# Patient Record
Sex: MALE | Race: WHITE | Employment: FULL TIME | URBAN - METROPOLITAN AREA
[De-identification: names, ages, dates, MRNs, and addresses within clinical notes are randomized per-mention and may not be internally consistent; named-entity substitution may affect disease eponyms.]

---

## 2024-06-12 RX ORDER — B-COMPLEX WITH VITAMIN C
1 TABLET ORAL EVERY MORNING
COMMUNITY

## 2024-06-12 RX ORDER — GINSENG 100 MG
1 CAPSULE ORAL NIGHTLY
COMMUNITY

## 2024-06-12 RX ORDER — CHLORAL HYDRATE 500 MG
2000 CAPSULE ORAL EVERY MORNING
COMMUNITY

## 2024-06-13 NOTE — H&P
[facility]    PATIENT'S NAME: OSCAR RAMOS    ATTENDING PHYSICIAN: Mark Lizarraga MD   PATIENT ACCOUNT#:   [id number]    LOCATION:    MEDICAL RECORD #:   [id number]       YOB: 1990  ADMISSION DATE:       06/19/2024    HISTORY AND PHYSICAL EXAMINATION    HISTORY OF PRESENT ILLNESS:  The patient is a 34-year-old right-hand dominant male with left elbow pain.  He was doing a moderately heavy upper body workout and afterward his elbow felt stiff and progressive locking late that night, pain with full extension and pain past 90 degrees of flexion.  He has a history of playing football, offensive and defensive line.  He does not recall any specific injury to his elbow.  He previously saw Dr. Bullock who had scheduled him for an open elbow procedure and then referred him to Dr. Lizarraga just to discuss arthroscopic procedure instead.      PAST MEDICAL HISTORY:  Significant for sleep apnea.     PAST SURGICAL HISTORY:  None.     MEDICATIONS:  None.    ALLERGIES:  None.    PHYSICAL EXAMINATION:  He is 6 feet 4 inches, 220 pounds.  Physical exam to the left elbow demonstrates elbow flexion to 90 with extension to 20, full pronosupination with pain with terminal stretching, and positive impingement signs.      MRI scan of the left elbow demonstrates osteoarthritis with multiple loose bodies in the anterior and posterior joint compartments.     ASSESSMENT AND PLAN:  The patient is a 34-year-old gentleman with left elbow osteoarthritis and loose bodies.  At this time will proceed with left elbow arthroscopic debridement, removal of loose bodies from his elbow.  We discussed all the risks and complications associated with surgery, and he would like to proceed at this time.    Mary Mack PA-C, dictating for Mark Lizarraga MD.     Dictated By Mark Lizarraga MD  d: 06/13/2024 07:00:46  t: 06/13/2024 07:29:20  Job [id number]  /

## 2024-06-18 NOTE — DISCHARGE INSTRUCTIONS
HOME INSTRUCTIONS  Please refer to instructions provided by Dr. Lizarraga's office   AMBSURG HOME CARE INSTRUCTIONS: POST-OP ANESTHESIA  The medication that you received for sedation or general anesthesia can last up to 24 hours. Your judgment and reflexes may be altered, even if you feel like your normal self.      We Recommend:   Do not drive any motor vehicle or bicycle   Avoid mowing the lawn, playing sports, or working with power tools/applicances (power saws, electric knives or mixers)   That you have someone stay with you on your first night home   Do not drink alcohol or take sleeping pills or tranquilizers   Do not sign legal documents within 24 hours of your procedure   If you had a nerve block for your surgery, take extra care not to put any pressure on your arm or hand for 24 hours    It is normal:  For you to have a sore throat if you had a breathing tube during surgery (while you were asleep!). The sore throat should get better within 48 hours. You can gargle with warm salt water (1/2 tsp in 4 oz warm water) or use a throat lozenge for comfort  To feel muscle aches or soreness especially in the abdomen, chest or neck. The achy feeling should go away in the next 24 hours  To feel weak, sleepy or \"wiped out\". Your should start feeling better in the next 24 hours.   To experience mild discomforts such as sore lip or tongue, headache, cramps, gas pains or a bloated feeling in your abdomen.   To experience mild back pain or soreness for a day or two if you had spinal or epidural anesthesia.   If you had laparoscopic surgery, to feel shoulder pain or discomfort on the day of surgery.   For some patients to have nausea after surgery/anesthesia    If you feel nausea or experience vomiting:   Try to move around less.   Eat less than usual or drink only liquids until the next morning   Nausea should resolve in about 24 hours    If you have a problem when you are at home:    Call your surgeons office   Discharge  Instructions: After Your Surgery  You’ve just had surgery. During surgery, you were given medicine called anesthesia to keep you relaxed and free of pain. After surgery, you may have some pain or nausea. This is common. Here are some tips for feeling better and getting well after surgery.   Going home  Your healthcare provider will show you how to take care of yourself when you go home. They'll also answer your questions. Have an adult family member or friend drive you home. For the first 24 hours after your surgery:   Don't drive or use heavy equipment.  Don't make important decisions or sign legal papers.  Take medicines as directed.  Don't drink alcohol.  Have someone stay with you, if needed. They can watch for problems and help keep you safe.  Be sure to go to all follow-up visits with your healthcare provider. And rest after your surgery for as long as your provider tells you to.   Coping with pain  If you have pain after surgery, pain medicine will help you feel better. Take it as directed, before pain becomes severe. Also, ask your healthcare provider or pharmacist about other ways to control pain. This might be with heat, ice, or relaxation. And follow any other instructions your surgeon or nurse gives you.      Stay on schedule with your medicine.     Tips for taking pain medicine  To get the best relief possible, remember these points:   Pain medicines can upset your stomach. Taking them with a little food may help.  Most pain relievers taken by mouth need at least 20 to 30 minutes to start to work.  Don't wait till your pain becomes severe before you take your medicine. Try to time your medicine so that you can take it before starting an activity. This might be before you get dressed, go for a walk, or sit down for dinner.  Constipation is a common side effect of some pain medicines. Call your healthcare provider before taking any medicines such as laxatives or stool softeners to help ease constipation.  Also ask if you should skip any foods. Drinking lots of fluids and eating foods such as fruits and vegetables that are high in fiber can also help. Remember, don't take laxatives unless your surgeon has prescribed them.  Drinking alcohol and taking pain medicine can cause dizziness and slow your breathing. It can even be deadly. Don't drink alcohol while taking pain medicine.  Pain medicine can make you react more slowly to things. Don't drive or run machinery while taking pain medicine.  Your healthcare provider may tell you to take acetaminophen to help ease your pain. Ask them how much you're supposed to take each day. Acetaminophen or other pain relievers may interact with your prescription medicines or other over-the-counter (OTC) medicines. Some prescription medicines have acetaminophen and other ingredients in them. Using both prescription and OTC acetaminophen for pain can cause you to accidentally overdose. Read the labels on your OTC medicines with care. This will help you to clearly know the list of ingredients, how much to take, and any warnings. It may also help you not take too much acetaminophen. If you have questions or don't understand the information, ask your pharmacist or healthcare provider to explain it to you before you take the OTC medicine.   Managing nausea  Some people have an upset stomach (nausea) after surgery. This is often because of anesthesia, pain, or pain medicine, less movement of food in the stomach, or the stress of surgery. These tips will help you handle nausea and eat healthy foods as you get better. If you were on a special food plan before surgery, ask your healthcare provider if you should follow it while you get better. Check with your provider on how your eating should progress. It may depend on the surgery you had. These general tips may help:   Don't push yourself to eat. Your body will tell you when to eat and how much.  Start off with clear liquids and soup. They're  easier to digest.  Next try semi-solid foods as you feel ready. These include mashed potatoes, applesauce, and gelatin.  Slowly move to solid foods. Don’t eat fatty, rich, or spicy foods at first.  Don't force yourself to have 3 large meals a day. Instead eat smaller amounts more often.  Take pain medicines with a small amount of solid food, such as crackers or toast. This helps prevent nausea.  When to call your healthcare provider  Call your healthcare provider right away if you have any of these:   You still have too much pain, or the pain gets worse, after taking the medicine. The medicine may not be strong enough. Or there may be a complication from the surgery.  You feel too sleepy, dizzy, or groggy. The medicine may be too strong.  Side effects such as nausea or vomiting. Your healthcare provider may advise taking other medicines to .  Skin changes such as rash, itching, or hives. This may mean you have an allergic reaction. Your provider may advise taking other medicines.  The incision looks different (for instance, part of it opens up).  Bleeding or fluid leaking from the incision site, and weren't told to expect that.  Fever of 100.4°F (38°C) or higher, or as directed by your provider.  Call 911  Call 911 right away if you have:   Trouble breathing  Facial swelling    If you have obstructive sleep apnea   You were given anesthesia medicine during surgery to keep you comfortable and free of pain. After surgery, you may have more apnea spells because of this medicine and other medicines you were given. The spells may last longer than normal.    At home:  Keep using the continuous positive airway pressure (CPAP) device when you sleep. Unless your healthcare provider tells you not to, use it when you sleep, day or night. CPAP is a common device used to treat obstructive sleep apnea.  Talk with your provider before taking any pain medicine, muscle relaxants, or sedatives. Your provider will tell you about the  possible dangers of taking these medicines.  Contact your provider if your sleeping changes a lot even when taking medicines as directed.  Samuel last reviewed this educational content on 10/1/2021  © 0955-2561 [employer]. All rights reserved. This information is not intended as a substitute for professional medical care. Always follow your healthcare professional's instructions.

## 2024-06-19 ENCOUNTER — HOSPITAL ENCOUNTER (OUTPATIENT)
Facility: HOSPITAL | Age: 34
Setting detail: HOSPITAL OUTPATIENT SURGERY
Discharge: HOME | End: 2024-06-19
Attending: ORTHOPAEDIC SURGERY | Admitting: ORTHOPAEDIC SURGERY
Payer: COMMERCIAL

## 2024-06-19 ENCOUNTER — ANESTHESIA EVENT (OUTPATIENT)
Dept: SURGERY | Facility: HOSPITAL | Age: 34
End: 2024-06-19
Payer: COMMERCIAL

## 2024-06-19 ENCOUNTER — ANESTHESIA (OUTPATIENT)
Dept: SURGERY | Facility: HOSPITAL | Age: 34
End: 2024-06-19
Payer: COMMERCIAL

## 2024-06-19 VITALS
HEIGHT: 75 IN | HEART RATE: 56 BPM | SYSTOLIC BLOOD PRESSURE: 126 MMHG | BODY MASS INDEX: 27.35 KG/M2 | OXYGEN SATURATION: 97 % | DIASTOLIC BLOOD PRESSURE: 71 MMHG | RESPIRATION RATE: 16 BRPM | WEIGHT: 220 LBS | TEMPERATURE: 98 F

## 2024-06-19 DIAGNOSIS — M19.029 OSTEOARTHRITIS OF ELBOW, UNSPECIFIED LATERALITY, UNSPECIFIED OSTEOARTHRITIS TYPE: Primary | ICD-10-CM

## 2024-06-19 PROCEDURE — 64415 NJX AA&/STRD BRCH PLXS IMG: CPT | Performed by: ORTHOPAEDIC SURGERY

## 2024-06-19 PROCEDURE — 0RBM4ZZ EXCISION OF LEFT ELBOW JOINT, PERCUTANEOUS ENDOSCOPIC APPROACH: ICD-10-PCS | Performed by: ORTHOPAEDIC SURGERY

## 2024-06-19 PROCEDURE — 0RCM4ZZ EXTIRPATION OF MATTER FROM LEFT ELBOW JOINT, PERCUTANEOUS ENDOSCOPIC APPROACH: ICD-10-PCS | Performed by: ORTHOPAEDIC SURGERY

## 2024-06-19 RX ORDER — LIDOCAINE HYDROCHLORIDE 10 MG/ML
INJECTION, SOLUTION EPIDURAL; INFILTRATION; INTRACAUDAL; PERINEURAL AS NEEDED
Status: DISCONTINUED | OUTPATIENT
Start: 2024-06-19 | End: 2024-06-19 | Stop reason: SURG

## 2024-06-19 RX ORDER — HYDROMORPHONE HYDROCHLORIDE 1 MG/ML
0.6 INJECTION, SOLUTION INTRAMUSCULAR; INTRAVENOUS; SUBCUTANEOUS EVERY 5 MIN PRN
Status: DISCONTINUED | OUTPATIENT
Start: 2024-06-19 | End: 2024-06-19

## 2024-06-19 RX ORDER — MORPHINE SULFATE 4 MG/ML
4 INJECTION, SOLUTION INTRAMUSCULAR; INTRAVENOUS EVERY 10 MIN PRN
Status: DISCONTINUED | OUTPATIENT
Start: 2024-06-19 | End: 2024-06-19

## 2024-06-19 RX ORDER — HYDROMORPHONE HYDROCHLORIDE 1 MG/ML
0.2 INJECTION, SOLUTION INTRAMUSCULAR; INTRAVENOUS; SUBCUTANEOUS EVERY 5 MIN PRN
Status: DISCONTINUED | OUTPATIENT
Start: 2024-06-19 | End: 2024-06-19

## 2024-06-19 RX ORDER — NALOXONE HYDROCHLORIDE 0.4 MG/ML
80 INJECTION, SOLUTION INTRAMUSCULAR; INTRAVENOUS; SUBCUTANEOUS AS NEEDED
Status: DISCONTINUED | OUTPATIENT
Start: 2024-06-19 | End: 2024-06-19

## 2024-06-19 RX ORDER — INDOMETHACIN 75 MG/1
75 CAPSULE, EXTENDED RELEASE ORAL
Status: SHIPPED | COMMUNITY
Start: 2024-06-19

## 2024-06-19 RX ORDER — CEPHALEXIN 500 MG/1
500 CAPSULE ORAL 4 TIMES DAILY
Status: SHIPPED | COMMUNITY

## 2024-06-19 RX ORDER — SODIUM CHLORIDE, SODIUM LACTATE, POTASSIUM CHLORIDE, CALCIUM CHLORIDE 600; 310; 30; 20 MG/100ML; MG/100ML; MG/100ML; MG/100ML
INJECTION, SOLUTION INTRAVENOUS CONTINUOUS
Status: DISCONTINUED | OUTPATIENT
Start: 2024-06-19 | End: 2024-06-19

## 2024-06-19 RX ORDER — ONDANSETRON 2 MG/ML
4 INJECTION INTRAMUSCULAR; INTRAVENOUS EVERY 6 HOURS PRN
Status: DISCONTINUED | OUTPATIENT
Start: 2024-06-19 | End: 2024-06-19

## 2024-06-19 RX ORDER — MORPHINE SULFATE 4 MG/ML
2 INJECTION, SOLUTION INTRAMUSCULAR; INTRAVENOUS EVERY 10 MIN PRN
Status: DISCONTINUED | OUTPATIENT
Start: 2024-06-19 | End: 2024-06-19

## 2024-06-19 RX ORDER — HYDROMORPHONE HYDROCHLORIDE 1 MG/ML
0.4 INJECTION, SOLUTION INTRAMUSCULAR; INTRAVENOUS; SUBCUTANEOUS EVERY 5 MIN PRN
Status: DISCONTINUED | OUTPATIENT
Start: 2024-06-19 | End: 2024-06-19

## 2024-06-19 RX ORDER — MORPHINE SULFATE 15 MG/1
15 TABLET, FILM COATED, EXTENDED RELEASE ORAL EVERY 12 HOURS SCHEDULED
Status: SHIPPED | COMMUNITY
Start: 2024-06-19

## 2024-06-19 RX ORDER — ONDANSETRON 4 MG/1
4 TABLET, FILM COATED ORAL EVERY 8 HOURS PRN
Status: SHIPPED | COMMUNITY

## 2024-06-19 RX ORDER — ONDANSETRON 2 MG/ML
INJECTION INTRAMUSCULAR; INTRAVENOUS AS NEEDED
Status: DISCONTINUED | OUTPATIENT
Start: 2024-06-19 | End: 2024-06-19 | Stop reason: SURG

## 2024-06-19 RX ORDER — NEOSTIGMINE METHYLSULFATE 1 MG/ML
INJECTION, SOLUTION INTRAVENOUS AS NEEDED
Status: DISCONTINUED | OUTPATIENT
Start: 2024-06-19 | End: 2024-06-19 | Stop reason: SURG

## 2024-06-19 RX ORDER — ACETAMINOPHEN 500 MG
1000 TABLET ORAL ONCE
Status: COMPLETED | OUTPATIENT
Start: 2024-06-19 | End: 2024-06-19

## 2024-06-19 RX ORDER — HYDROCODONE BITARTRATE AND ACETAMINOPHEN 10; 325 MG/1; MG/1
TABLET ORAL
Status: SHIPPED | COMMUNITY

## 2024-06-19 RX ORDER — DEXAMETHASONE SODIUM PHOSPHATE 4 MG/ML
VIAL (ML) INJECTION AS NEEDED
Status: DISCONTINUED | OUTPATIENT
Start: 2024-06-19 | End: 2024-06-19 | Stop reason: SURG

## 2024-06-19 RX ORDER — PROCHLORPERAZINE EDISYLATE 5 MG/ML
5 INJECTION INTRAMUSCULAR; INTRAVENOUS EVERY 8 HOURS PRN
Status: DISCONTINUED | OUTPATIENT
Start: 2024-06-19 | End: 2024-06-19

## 2024-06-19 RX ORDER — MORPHINE SULFATE 10 MG/ML
6 INJECTION, SOLUTION INTRAMUSCULAR; INTRAVENOUS EVERY 10 MIN PRN
Status: DISCONTINUED | OUTPATIENT
Start: 2024-06-19 | End: 2024-06-19

## 2024-06-19 RX ORDER — ROPIVACAINE HYDROCHLORIDE 5 MG/ML
INJECTION, SOLUTION EPIDURAL; INFILTRATION; PERINEURAL
Status: COMPLETED | OUTPATIENT
Start: 2024-06-19 | End: 2024-06-19

## 2024-06-19 RX ORDER — GLYCOPYRROLATE 0.2 MG/ML
INJECTION, SOLUTION INTRAMUSCULAR; INTRAVENOUS AS NEEDED
Status: DISCONTINUED | OUTPATIENT
Start: 2024-06-19 | End: 2024-06-19 | Stop reason: SURG

## 2024-06-19 RX ORDER — ROCURONIUM BROMIDE 10 MG/ML
INJECTION, SOLUTION INTRAVENOUS AS NEEDED
Status: DISCONTINUED | OUTPATIENT
Start: 2024-06-19 | End: 2024-06-19 | Stop reason: SURG

## 2024-06-19 RX ADMIN — ONDANSETRON 4 MG: 2 INJECTION INTRAMUSCULAR; INTRAVENOUS at 08:20:00

## 2024-06-19 RX ADMIN — GLYCOPYRROLATE 0.8 MG: 0.2 INJECTION, SOLUTION INTRAMUSCULAR; INTRAVENOUS at 08:28:00

## 2024-06-19 RX ADMIN — ROCURONIUM BROMIDE 40 MG: 10 INJECTION, SOLUTION INTRAVENOUS at 07:20:00

## 2024-06-19 RX ADMIN — NEOSTIGMINE METHYLSULFATE 5 MG: 1 INJECTION, SOLUTION INTRAVENOUS at 08:28:00

## 2024-06-19 RX ADMIN — LIDOCAINE HYDROCHLORIDE 50 MG: 10 INJECTION, SOLUTION EPIDURAL; INFILTRATION; INTRACAUDAL; PERINEURAL at 07:20:00

## 2024-06-19 RX ADMIN — DEXAMETHASONE SODIUM PHOSPHATE 4 MG: 4 MG/ML VIAL (ML) INJECTION at 08:20:00

## 2024-06-19 RX ADMIN — ROPIVACAINE HYDROCHLORIDE 30 ML: 5 INJECTION, SOLUTION EPIDURAL; INFILTRATION; PERINEURAL at 09:12:00

## 2024-06-19 NOTE — ANESTHESIA PROCEDURE NOTES
Airway  Date/Time: 6/19/2024 7:20 AM  Urgency: Elective    Airway not difficult    General Information and Staff    Patient location during procedure: OR  Anesthesiologist: Dustin Gordon MD  Performed: anesthesiologist   Performed by: Dustin Gordon MD  Authorized by: Dustin Gordon MD      Indications and Patient Condition  Indications for airway management: anesthesia  Sedation level: deep  Preoxygenated: yes  Patient position: sniffing  Mask difficulty assessment: 1 - vent by mask    Final Airway Details  Final airway type: endotracheal airway      Successful airway: ETT  Cuffed: yes   Successful intubation technique: direct laryngoscopy  Endotracheal tube insertion site: oral    Placement verified by: capnometry   Measured from: teeth  Number of attempts at approach: 1

## 2024-06-19 NOTE — INTERVAL H&P NOTE
Pre-op Diagnosis: Osteoarthritis    The above referenced H&P was reviewed by FERNANDO HINES PA on 6/19/2024, the patient was examined and no significant changes have occurred in the patient's condition since the H&P was performed.  I discussed with the patient and/or legal representative the potential benefits, risks and side effects of this procedure; the likelihood of the patient achieving goals; and potential problems that might occur during recuperation.  I discussed reasonable alternatives to the procedure, including risks, benefits and side effects related to the alternatives and risks related to not receiving this procedure.  We will proceed with procedure as planned.

## 2024-06-19 NOTE — ANESTHESIA POSTPROCEDURE EVALUATION
Patient: Min Guidry    Procedure Summary       Date: 06/19/24 Room / Location: [facility] MAIN OR  / [facility] MAIN OR    Anesthesia Start: 0712 Anesthesia Stop:     Procedure: Left elbow arthroscopic debridement, removal loose bodies (Left: Elbow) Diagnosis: (Osteoarthritis)    Surgeons: Mark Lizarraga MD Anesthesiologist: Melly Mckeon MD    Anesthesia Type: general ASA Status: 2            Anesthesia Type: general    Vitals Value Taken Time   /96 06/19/24 0903   Temp 98 06/19/24 0912   Pulse 67 06/19/24 0911   Resp 16 06/19/24 0911   SpO2 99 % 06/19/24 0911   Vitals shown include unfiled device data.    [facility] AN Post Evaluation:   Patient Evaluated in PACU  Patient Participation: complete - patient participated  Level of Consciousness: awake  Pain Management: adequate  Airway Patency:patent  Dental exam unchanged from preop  Yes    Cardiovascular Status: acceptable  Respiratory Status: acceptable  Postoperative Hydration acceptable      MELLY MCKEON MD  6/19/2024 9:12 AM

## 2024-06-19 NOTE — BRIEF OP NOTE
Pre-Operative Diagnosis: Osteoarthritis     Post-Operative Diagnosis: Osteoarthritis      Procedure Performed:   Left elbow arthroscopic debridement, removal loose bodies    Surgeons and Role:     * Mark Lizarraga MD - Primary    Assistant(s):  PA: Dobroveanu, Amy, PA Michael Peabody MD    Surgical Findings: same     Specimen: loose bodies     Estimated Blood Loss: No data recorded      FERNANDO HINES PA  6/19/2024  10:56 AM

## 2024-06-19 NOTE — ANESTHESIA PROCEDURE NOTES
Peripheral Block    Date/Time: 6/19/2024 9:12 AM    Performed by: Dustin Gordon MD  Authorized by: Dustin Gordon MD      General Information and Staff    Start Time:  6/19/2024 9:12 AM  End Time:  6/19/2024 9:12 AM  Anesthesiologist:  Dustin Gordon MD  Performed by:  Anesthesiologist  Patient Location:  PACU      Site Identification: real time ultrasound guided and image stored and retrievable      Reason for Block: at surgeon's request and post-op pain management    Preanesthetic Checklist: 2 patient identifers, IV checked, risks and benefits discussed, monitors and equipment checked, pre-op evaluation, timeout performed, anesthesia consent and sterile technique used      Procedure Details    Patient Position:  Sitting  Prep: ChloraPrep    Monitoring:  Cardiac monitor  Block Type:  Interscalene  Laterality:  Left  Injection Technique:  Single-shot    Needle    Needle Type:  Short-bevel  Needle Gauge:  22 G  Needle Length:  50 mm  Needle Localization:  Ultrasound guidance              Assessment    Injection Assessment:  Good spread noted, incremental injection, local visualized surrounding nerve on ultrasound, low pressure, negative aspiration for heme and no pain on injection  Heart Rate Change: No        Medications  6/19/2024 9:12 AM  ropivacaine (NAROPIN) injection 0.5% - Infiltration   30 mL - 6/19/2024 9:12:00 AM    Additional Comments

## 2024-06-19 NOTE — ANESTHESIA PREPROCEDURE EVALUATION
Anesthesia PreOp Note    HPI:     Min Guidry is a 34 year old male who presents for preoperative consultation requested by: Mark Lizarraga MD    Date of Surgery: 6/19/2024    Procedure(s):  Left elbow arthroscopic debridement, removal loose bodies  Indication: Osteoarthritis    Relevant Problems   No relevant active problems       NPO:  Last Liquid Consumption Date: 06/18/24  Last Liquid Consumption Time: 2300  Last Solid Consumption Date: 06/18/24  Last Solid Consumption Time: 2300  Last Liquid Consumption Date: 06/18/24          History Review:  There are no problems to display for this patient.      Past Medical History:    Anxiety state    Sleep apnea    uses CPAP    Visual impairment    wears glasses/contacts       Past Surgical History:   Procedure Laterality Date    Colonoscopy      1/2024    Other surgical history  01/01/2007    left thumb UCL repair    Tonsillectomy         Medications Prior to Admission   Medication Sig Dispense Refill Last Dose    B Complex Vitamins (VITAMIN B COMPLEX) Oral Tab Take 1 tablet by mouth every morning.   6/14/2024    METHYLSULFONYLMETHANE OR Take 2 tablets by mouth every morning.   6/14/2024    omega-3 fatty acids 1000 MG Oral Cap Take 2,000 mg by mouth every morning.   6/14/2024    Zinc 50 MG Oral Tab Take 1 tablet by mouth at bedtime.   6/14/2024    MAGNESIUM CITRATE OR Take 1 tablet by mouth at bedtime.   6/14/2024    PAXIL 30 MG OR TABS 1 TABLET DAILY (Patient not taking: Reported on 6/12/2024)   Not Taking    SONATA 10 MG OR CAPS 1 CAPSULE AT BEDTIME (Patient not taking: Reported on 6/12/2024)   Not Taking     Current Facility-Administered Medications Ordered in Epic   Medication Dose Route Frequency Provider Last Rate Last Admin    lactated ringers infusion   Intravenous Continuous Mark Lizarraga MD 20 mL/hr at 06/19/24 0629 New Bag at 06/19/24 0629    ceFAZolin (Ancef) 2g in 10mL IV syringe premix  2 g Intravenous Once Mary Mack PA         No current  [facility]-ordered outpatient medications on file.       No Known Allergies    Family History   Problem Relation Age of Onset    Heart Disorder Maternal Grandfather     Heart Disorder Paternal Grandfather      Social History     Socioeconomic History    Marital status:    Tobacco Use    Smoking status: Never    Smokeless tobacco: Never   Vaping Use    Vaping status: Never Used   Substance and Sexual Activity    Alcohol use: Not Currently     Alcohol/week: 16.0 standard drinks of alcohol     Types: 16 Cans of beer per week    Drug use: No       Available pre-op labs reviewed.             Vital Signs:  Body mass index is 27.5 kg/m².   height is 1.905 m (6' 3\") and weight is 99.8 kg (220 lb).   Vitals:    06/12/24 1158   Weight: 99.8 kg (220 lb)   Height: 1.905 m (6' 3\")        Anesthesia Evaluation     Patient summary reviewed and Nursing notes reviewed    Airway   Mallampati: I  Dental      Pulmonary - negative ROS and normal exam   Cardiovascular - normal exam  Exercise tolerance: good    NYHA Classification: I    Neuro/Psych - negative ROS     GI/Hepatic/Renal - negative ROS     Endo/Other - negative ROS   Abdominal                  Anesthesia Plan:   ASA:  2  Plan:   General  Airway:  ETT      I have informed Min Guidry and/or legal guardian or family member of the nature of the anesthetic plan, benefits, risks including possible dental damage if relevant, major complications, and any alternative forms of anesthetic management.   All of the patient's questions were answered to the best of my ability. The patient desires the anesthetic management as planned.  MELLY MCKEON MD  6/19/2024 6:56 AM  Present on Admission:  **None**

## 2024-12-27 ENCOUNTER — HOSPITAL ENCOUNTER (EMERGENCY)
Age: 34
Discharge: HOME | End: 2024-12-27

## 2024-12-27 ENCOUNTER — APPOINTMENT (OUTPATIENT)
Dept: GENERAL RADIOLOGY | Age: 34
End: 2024-12-27

## 2024-12-27 VITALS
RESPIRATION RATE: 13 BRPM | SYSTOLIC BLOOD PRESSURE: 127 MMHG | TEMPERATURE: 98.5 F | OXYGEN SATURATION: 98 % | DIASTOLIC BLOOD PRESSURE: 91 MMHG | HEART RATE: 57 BPM

## 2024-12-27 DIAGNOSIS — R07.9 CHEST PAIN, UNSPECIFIED TYPE: Primary | ICD-10-CM

## 2024-12-27 LAB
ALBUMIN SERPL-MCNC: 4.2 G/DL (ref 3.4–5)
ALBUMIN/GLOB SERPL: 1.1 {RATIO} (ref 1–2.4)
ALP SERPL-CCNC: 99 UNITS/L (ref 45–117)
ALT SERPL-CCNC: 39 UNITS/L
ANION GAP SERPL CALC-SCNC: 16 MMOL/L (ref 7–19)
AST SERPL-CCNC: 23 UNITS/L
BASOPHILS # BLD: 0.1 K/MCL (ref 0–0.3)
BASOPHILS NFR BLD: 1 %
BILIRUB SERPL-MCNC: 0.5 MG/DL (ref 0.2–1)
BUN SERPL-MCNC: 13 MG/DL (ref 6–20)
BUN/CREAT SERPL: 15 (ref 7–25)
CALCIUM SERPL-MCNC: 9.2 MG/DL (ref 8.4–10.2)
CHLORIDE SERPL-SCNC: 101 MMOL/L (ref 97–110)
CO2 SERPL-SCNC: 27 MMOL/L (ref 21–32)
CREAT SERPL-MCNC: 0.87 MG/DL (ref 0.67–1.17)
DEPRECATED RDW RBC: 40 FL (ref 39–50)
EGFRCR SERPLBLD CKD-EPI 2021: >90 ML/MIN/{1.73_M2}
EOSINOPHIL # BLD: 0.2 K/MCL (ref 0–0.5)
EOSINOPHIL NFR BLD: 3 %
ERYTHROCYTE [DISTWIDTH] IN BLOOD: 12 % (ref 11–15)
FASTING DURATION TIME PATIENT: ABNORMAL H
FLUAV RNA RESP QL NAA+PROBE: NOT DETECTED
FLUBV RNA RESP QL NAA+PROBE: NOT DETECTED
GLOBULIN SER-MCNC: 3.8 G/DL (ref 2–4)
GLUCOSE SERPL-MCNC: 113 MG/DL (ref 70–99)
HCT VFR BLD CALC: 43.8 % (ref 39–51)
HGB BLD-MCNC: 14.9 G/DL (ref 13–17)
IMM GRANULOCYTES # BLD AUTO: 0 K/MCL (ref 0–0.2)
IMM GRANULOCYTES # BLD: 0 %
LYMPHOCYTES # BLD: 1.6 K/MCL (ref 1–4.8)
LYMPHOCYTES NFR BLD: 23 %
MAGNESIUM SERPL-MCNC: 2.1 MG/DL (ref 1.7–2.4)
MCH RBC QN AUTO: 30.8 PG (ref 26–34)
MCHC RBC AUTO-ENTMCNC: 34 G/DL (ref 32–36.5)
MCV RBC AUTO: 90.7 FL (ref 78–100)
MONOCYTES # BLD: 0.6 K/MCL (ref 0.3–0.9)
MONOCYTES NFR BLD: 9 %
NEUTROPHILS # BLD: 4.4 K/MCL (ref 1.8–7.7)
NEUTROPHILS NFR BLD: 64 %
NRBC BLD MANUAL-RTO: 0 /100 WBC
PLATELET # BLD AUTO: 209 K/MCL (ref 140–450)
POTASSIUM SERPL-SCNC: 4.2 MMOL/L (ref 3.4–5.1)
PROT SERPL-MCNC: 8 G/DL (ref 6.4–8.2)
RBC # BLD: 4.83 MIL/MCL (ref 4.5–5.9)
RSV AG NPH QL IA.RAPID: NOT DETECTED
SARS-COV-2 RNA RESP QL NAA+PROBE: NOT DETECTED
SERVICE CMNT-IMP: NORMAL
SERVICE CMNT-IMP: NORMAL
SODIUM SERPL-SCNC: 140 MMOL/L (ref 135–145)
TROPONIN I SERPL DL<=0.01 NG/ML-MCNC: <4 NG/L
WBC # BLD: 6.8 K/MCL (ref 4.2–11)

## 2024-12-27 PROCEDURE — 71045 X-RAY EXAM CHEST 1 VIEW: CPT

## 2024-12-27 PROCEDURE — 83735 ASSAY OF MAGNESIUM: CPT

## 2024-12-27 PROCEDURE — 80053 COMPREHEN METABOLIC PANEL: CPT

## 2024-12-27 PROCEDURE — 99285 EMERGENCY DEPT VISIT HI MDM: CPT

## 2024-12-27 PROCEDURE — 85025 COMPLETE CBC W/AUTO DIFF WBC: CPT

## 2024-12-27 PROCEDURE — 36415 COLL VENOUS BLD VENIPUNCTURE: CPT

## 2024-12-27 PROCEDURE — 84484 ASSAY OF TROPONIN QUANT: CPT

## 2024-12-27 PROCEDURE — 93005 ELECTROCARDIOGRAM TRACING: CPT

## 2024-12-27 PROCEDURE — 0241U COVID/FLU/RSV PANEL: CPT

## 2024-12-27 ASSESSMENT — HEART SCORE
RISK FACTORS: NO RISK FACTORS KNOWN
EKG: NORMAL
AGE: LESS THAN OR EQUAL TO 45
HISTORY: MODERATELY SUSPICIOUS
HEART SCORE: 1
TROPONIN: EQUAL OR LESS THAN NORMAL LIMIT

## 2024-12-27 ASSESSMENT — ENCOUNTER SYMPTOMS: SHORTNESS OF BREATH: 1

## 2024-12-28 LAB
ATRIAL RATE (BPM): 60
P AXIS (DEGREES): 57
PR-INTERVAL (MSEC): 142
QRS-INTERVAL (MSEC): 86
QT-INTERVAL (MSEC): 396
QTC: 396
R AXIS (DEGREES): 58
RAINBOW EXTRA TUBES HOLD SPECIMEN: NORMAL
RAINBOW EXTRA TUBES HOLD SPECIMEN: NORMAL
REPORT TEXT: NORMAL
T AXIS (DEGREES): 62
VENTRICULAR RATE EKG/MIN (BPM): 60

## 2025-01-05 NOTE — OPERATIVE REPORT
Problem: Mechanical Ventilation Invasive  Goal: Mechanical Ventilation Liberation  Outcome: Adequate for Care Transition  Goal: Absence of Device-Related Skin and Tissue Injury  Outcome: Adequate for Care Transition  Intervention: Maintain Skin and Tissue Health  Description: Reposition and resecure endotracheal tube regularly; ensure proper tube location.  Monitor depth of suction catheter advancement to minimize the risk of internal tracheobronchial tissue injury.  Assess skin and mucosal areas around the device frequently.  Monitor tightness of securement device regularly; consider skin barrier protection.  Minimize pressure points and prevent traction on device, using careful positioning, flexible extenders and props.  Assess and monitor for the presence of bleeding that may indicate injury to tracheobronchial tissue. Notify provider for persistent bleeding.  Anticipate adjunct therapy, such as cool mist, racemic epinephrine, corticosteroid or heliox, for symptoms related to airway swelling or stridor after removal of tube.  Recent Flowsheet Documentation  Taken 1/5/2025 0800 by Leticia Vick RN  Device Skin Pressure Protection: pressure points protected  Goal: Absence of Ventilator-Induced Lung Injury  Outcome: Adequate for Care Transition  Intervention: Facilitate Lung-Protection Measures  Description: Provide oxygen therapy judiciously to maintain oxygenation goals; adjust to avoid hyperoxia.  Monitor and limit ventilator tidal volumes to minimize volutrauma; initiate low tidal-volume strategy (e.g., less than 8 mL/kg for ideal body weight).  Monitor and limit ventilator pressure to reduce risk of barotrauma; maintain less than 30cm H2O (e.g., plateau, inspiratory pressure delta).  Apply PEEP (positive end expiratory pressure) to minimize atelectasis; adjust for changes in lung compliance and oxygenation.  Monitor fluid balance closely to minimize the risk of fluid overload.  Monitor ventilator  [facility]    PATIENT'S NAME: OSCAR RAMOS   ATTENDING PHYSICIAN: Mark Lizarraga MD   OPERATING PHYSICIAN: Mark Lizarraga MD   PATIENT ACCOUNT#:   [id number]    LOCATION:  [address]  MEDICAL RECORD #:   [id number]       YOB: 1990  ADMISSION DATE:       06/19/2024      OPERATION DATE:  06/19/2024    OPERATIVE REPORT      PREOPERATIVE DIAGNOSIS:    1.   Left elbow osteoarthritis.  2.   Loose bodies, left elbow.  POSTOPERATIVE DIAGNOSIS:    PROCEDURE:    1.   Left arthroscopic extensive debridement.  2.   Left arthroscopic removal of multiple loose bodies of the elbow.    INDICATIONS:  This is a 34-year-old male with a history of painful elbow.    OPERATIVE TECHNIQUE:  The patient was placed in the lateral decubitus position.  The elbow was insufflated with 20 mL of fluid.  An anteromedial working portal was created using an inside-out technique.  On entering the elbow joint, there were multiple loose bodies present in the elbow at the anterior compartment at least 1 cm in size.  With the use of arthroscopic grasping forceps, loose fragments and bone were removed from the anterior compartment.    I then turned attention toward the anterior compartment.  There was extensive synovitis and fraying of the anterior capsule.  A motorized shaver was used to perform a complete synovectomy and debride any frayed edges of the capsule.  There were osteophytes present in the coronoid and radial fossa.  With the use of curettes, osteotomes, tee, we evacuated both the coronoid fossa and then the radial fossa.  There was extensive synovitis at the proximal radioulnar joint which was debrided.  There were osteophytic formations at the tip of the coronoid which were likewise removed with both the shaver osteotome and grasping forceps.  Scope was then placed in the anterolateral portal with the instruments working in the anteromedial portal.  We debrided the medial aspect of the coronoid  fossa.  An extensive debridement and flushing out of the anterior compartment was then performed.    The scope was then placed in the posterior compartment.  Through a posterolateral portal with the posterior working portal, there was very thickened scarred capsule which was resected, exposed in the olecranon fossa.  We debrided some of the fat pad to allow for excellent visualization.  Two loose bodies were noted in the posterior compartment which were extracted with use of grasping forceps.  We then removed all osteophytic formation from the medial and lateral column with the use of osteotomes, curettes, and tee.  The depth of the olecranon fossa was re-established with the use of curettes.  The olecranon was then extended and osteophytes were taken off the medial and lateral gutters as well as the posterior tip.  After a thorough debridement, the instruments were removed.  The portal sites were closed, and the patient was sent to the recovery room in stable condition.    Dictated By Mark Lizararga MD  d: 06/19/2024 12:51:40  t: 06/19/2024 20:24:41  [facility] [id number]  /   waveforms and promote patient-ventilator synchrony; adjust ventilator settings and sedation.  Recent Flowsheet Documentation  Taken 1/5/2025 0800 by Leticia Vick RN  Lung Protection Measures: lung compliance monitored  Intervention: Prevent Ventilator-Associated Pneumonia  Description: Assess readiness to extubate; perform sedation interruption and spontaneous breathing trial.  Maintain semirecumbent position to minimize aspiration risk.  Provide ongoing oral care to reduce pathogens in oral cavity.  Consider the use of antiseptic cloths for daily bathing.  Minimize ventilator circuit breaks; consider use of closed suction device.  Minimize microaspiration risk; consider the use of ultrathin polyurethane tapered endotracheal tubes with subglottic secretion drainage, as well as cuff pressure monitoring.  Assess need for stress ulcer and venous thromboembolism prophylaxis due to increased risk during mechanical ventilation.  Recent Flowsheet Documentation  Taken 1/5/2025 1200 by Leticia Vick RN  Oral Care: teeth brushed - suction toothbrush  Taken 1/5/2025 1100 by Leticia Vick RN  Head of Bed (HOB) Positioning: HOB at 30-45 degrees  Taken 1/5/2025 0900 by Leticia Vick RN  Head of Bed (HOB) Positioning: HOB at 30-45 degrees  Taken 1/5/2025 0800 by Leticia Vick RN  Oral Care: teeth brushed - suction toothbrush  Taken 1/5/2025 0700 by Leticia Vick RN  Head of Bed (HOB) Positioning: HOB at 30-45 degrees     Problem: Adult Inpatient Plan of Care  Goal: Plan of Care Review  Outcome: Adequate for Care Transition  Goal: Patient-Specific Goal (Individualized)  Outcome: Adequate for Care Transition  Goal: Absence of Hospital-Acquired Illness or Injury  Outcome: Adequate for Care Transition  Intervention: Identify and Manage Fall Risk  Description: Perform standard risk assessment on admission using a validated tool or comprehensive approach appropriate to the patient; reassess fall risk  frequently, with change in status or transfer to another level of care.  Communicate risk to interprofessional healthcare team; ensure fall risk visible cue.  Determine need for increased observation, equipment and environmental modification, as well as use of supportive, nonskid footwear.  Adjust safety measures to individual needs and identified risk factors.  Reinforce the importance of active participation with fall risk prevention, safety, and physical activity with the patient and family.  Perform regular intentional rounding to assess need for position change, pain assessment and personal needs, including assistance with toileting.  Recent Flowsheet Documentation  Taken 1/5/2025 1200 by Leticia Vick RN  Safety Promotion/Fall Prevention: safety round/check completed  Taken 1/5/2025 1100 by Leticia Vick RN  Safety Promotion/Fall Prevention: safety round/check completed  Taken 1/5/2025 1000 by Leticia Vick RN  Safety Promotion/Fall Prevention: safety round/check completed  Taken 1/5/2025 0900 by Leticia Vick RN  Safety Promotion/Fall Prevention: safety round/check completed  Taken 1/5/2025 0800 by Leticia Vick RN  Safety Promotion/Fall Prevention: safety round/check completed  Taken 1/5/2025 0700 by Leticia Vick RN  Safety Promotion/Fall Prevention: safety round/check completed  Intervention: Prevent Skin Injury  Description: Perform a screening for skin injury risk, such as pressure or moisture-associated skin damage on admission and at regular intervals throughout hospital stay.  Keep all areas of skin (especially folds) clean and dry.  Maintain adequate skin hydration.  Relieve and redistribute pressure and protect bony prominences and skin at risk for injury; implement measures based on patient-specific risk factors.  Match turning and repositioning schedule to clinical condition.  Encourage weight shift frequently; assist with reposition if unable to complete  independently.  Float heels off bed; avoid pressure on the Achilles tendon.  Keep skin free from extended contact with medical devices.  Optimize nutrition and hydration.  Encourage functional activity and mobility, as early as tolerated.  Use aids (e.g., slide boards, mechanical lift) during transfer.  Recent Flowsheet Documentation  Taken 1/5/2025 1100 by Leticia Vick RN  Body Position:   turned   left  Taken 1/5/2025 0900 by Leticia Vick RN  Body Position:   turned   right  Taken 1/5/2025 0800 by Leticia Vick RN  Skin Protection: transparent dressing maintained  Taken 1/5/2025 0700 by Leticia Vick RN  Body Position:   turned   left  Intervention: Prevent and Manage VTE (Venous Thromboembolism) Risk  Description: Assess for VTE (venous thromboembolism) risk.  Promote early mobilization; encourage both active and passive leg exercises, if unable to ambulate.  Initiate and maintain compression or other therapy, as indicated, based on identified risk in accordance with organizational protocol and provider order.  Recognize the patient's individual risk for bleeding before initiating pharmacologic thromboprophylaxis.  Recent Flowsheet Documentation  Taken 1/5/2025 0800 by Leticia Vick RN  VTE Prevention/Management: (see mar) other (see comments)  Goal: Optimal Comfort and Wellbeing  Outcome: Adequate for Care Transition  Intervention: Provide Person-Centered Care  Description: Use a family-focused approach to care; encourage support system presence and participation.  Develop trust and rapport by proactively providing information, encouraging questions, addressing concerns and offering reassurance.  Acknowledge emotional response to hospitalization.  Recognize and utilize personal coping strategies and strengths; develop goals via shared decision-making.  Honor spiritual and cultural preferences.  Recent Flowsheet Documentation  Taken 1/5/2025 0800 by Leticia Vick RN  Trust  Relationship/Rapport: care explained  Goal: Readiness for Transition of Care  Outcome: Adequate for Care Transition     Problem: Fall Injury Risk  Goal: Absence of Fall and Fall-Related Injury  Outcome: Adequate for Care Transition  Intervention: Promote Injury-Free Environment  Description: Provide a safe, barrier-free environment that encourages independent activity.  Keep care area uncluttered and well-lighted.  Determine need for increased observation or monitoring.  Avoid use of devices that minimize mobility, such as restraints or indwelling urinary catheter.  Recent Flowsheet Documentation  Taken 1/5/2025 1200 by Leticia Vick RN  Safety Promotion/Fall Prevention: safety round/check completed  Taken 1/5/2025 1100 by Leticia Vick RN  Safety Promotion/Fall Prevention: safety round/check completed  Taken 1/5/2025 1000 by Leticia Vick RN  Safety Promotion/Fall Prevention: safety round/check completed  Taken 1/5/2025 0900 by Leticia Vick RN  Safety Promotion/Fall Prevention: safety round/check completed  Taken 1/5/2025 0800 by Leticia Vick RN  Safety Promotion/Fall Prevention: safety round/check completed  Taken 1/5/2025 0700 by Leticia Vick RN  Safety Promotion/Fall Prevention: safety round/check completed     Problem: Skin Injury Risk Increased  Goal: Skin Health and Integrity  Outcome: Adequate for Care Transition  Intervention: Optimize Skin Protection  Description: Perform a full pressure injury risk assessment, as indicated by screening, upon admission to care unit.  Reassess skin (full inspection and injury risk, including skin temperature, consistency and color) frequently (e.g., scheduled interval, with change in condition) to provide optimal early detection and prevention.  Maintain adequate tissue perfusion (e.g., encourage fluid balance; avoid crossing legs, constrictive clothing or devices) to promote tissue oxygenation.  Maintain head of bed at lowest degree  of elevation tolerated, considering medical condition and other restrictions. Use positioning supports to prevent sliding and friction. Consider low friction textiles.  Avoid positioning onto an area that remains reddened or on bony prominences.  Minimize incontinence and moisture (e.g., toileting schedule; moisture-wicking pad, diaper or incontinence collection device; skin moisture barrier).  Cleanse skin promptly and gently, when soiled, utilizing a pH-balanced cleanser.  Relieve and redistribute pressure (e.g., scheduled position changes, weight shifts, use of support surface, medical device repositioning, protective dressing application, use of positioning device, microclimate control, use of pressure-injury-monitor  Encourage increased activity, such as sitting in a chair at the bedside or early mobilization, when able to tolerate. Avoid prolonged sitting.  Recent Flowsheet Documentation  Taken 1/5/2025 1100 by Leticia Vick RN  Head of Bed (HOB) Positioning: HOB at 30-45 degrees  Taken 1/5/2025 0900 by Leticia Vick RN  Head of Bed (HOB) Positioning: HOB at 30-45 degrees  Taken 1/5/2025 0800 by Leticia Vick RN  Activity Management: bedrest  Pressure Reduction Techniques: heels elevated off bed  Pressure Reduction Devices: positioning supports utilized  Skin Protection: transparent dressing maintained  Taken 1/5/2025 0700 by Leticia Vick RN  Head of Bed (HOB) Positioning: HOB at 30-45 degrees     Problem: Sepsis/Septic Shock  Goal: Optimal Coping  Outcome: Adequate for Care Transition  Intervention: Support Patient and Family Response  Description: Acknowledge, normalize, validate intensity and complexity of patient and support system response to situation.  Provide opportunity for expression of thoughts, feelings and concerns; respond with compassion and reassurance.  Decrease stress and anxiety by providing information about patient's status and treatment.  Facilitate support system  presence and participation in care; consider providing a diary in intensive care situation.  Support coping by recognizing current coping strategies; provide aid in developing new strategies.  Assess and monitor for signs and symptoms of psychologic distress, anxiety and depression.  Utilize complementary therapy, such as music, massage or guided imagery.  Engage in proactive and ongoing discussion about goals of care and facilitate shared decision-making.  Connect with community resources for ongoing support, such as counseling and group support.  Recent Flowsheet Documentation  Taken 1/5/2025 0800 by Leticia Vick RN  Family/Support System Care: support provided  Goal: Absence of Bleeding  Outcome: Adequate for Care Transition  Goal: Blood Glucose Level Within Target Range  Outcome: Adequate for Care Transition  Goal: Absence of Infection Signs and Symptoms  Outcome: Adequate for Care Transition  Intervention: Promote Stabilization  Description: Monitor for signs of fluid responsiveness and overload; consider fluid adjustment and diuretic therapy.  Anticipate use of vasoactive agent to support microperfusion and oxygen delivery; titrate to response.  Monitor laboratory value, diagnostic test and clinical status trends for signs of infection progression and multiple organ failure.  Assess effectiveness of, and potential for, de-escalation of the antimicrobial regimen daily; promote antimicrobial stewardship.  Provide fever-reduction and comfort measures.  Monitor and manage electrolyte imbalance, such as hypocalcemia.  Use lung protective ventilation measures, such as low volume, inspiratory pressure, optimal positive end-expiratory pressure, to minimize the risk of ventilator-induced lung injury; ensure minute volume demands.  Prepare for supportive therapy, such as prone positioning, corticosteroid therapy, coagulopathy management, CRRT (continuous renal replacement therapy), hemofiltration and  cardiac-assist device.  Recent Flowsheet Documentation  Taken 1/5/2025 0800 by Leticia Vick RN  Lung Protection Measures: lung compliance monitored  Fluid/Electrolyte Management: fluids provided  Intervention: Promote Recovery  Description: Encourage pulmonary hygiene, such as cough-enhancement and airway-clearance techniques, that may include use of incentive spirometry, deep breathing and cough.  Encourage early rehabilitation and physical activity to optimize functional ability and activity tolerance, as well as minimize delirium.  Promote energy conservation; minimize oxygen demand and consumption by adjusting environment, decreasing stimulation, maintaining normothermia and treating pain.  Optimize fluid balance, nutrition intake, sleep and glycemic control to maintain tissue perfusion and enhance immune response.  Recent Flowsheet Documentation  Taken 1/5/2025 0800 by Leticia Vick RN  Activity Management: bedrest  Airway/Ventilation Management: oxygen therapy provided  Goal: Optimal Nutrition Delivery  Outcome: Adequate for Care Transition  Intervention: Optimize Nutrition Delivery  Description: Perform a nutritional assessment; include a nutrition-focused physical exam.  Determine calorie, protein, vitamin, mineral and fluid requirements.  Advocate for early enteral nutrition support; enteral is preferred over parenteral due to physiologic benefits, such as maintenance of gut integrity and function, reduction of infection risk and provision of stress ulcer prophylaxis.  Optimize protein intake unless contraindicated.  Assess for micronutrient deficiencies; supplement if depleted.  Consider postpyloric versus gastric tube feeding for patient at increased risk of aspiration.  Advocate and adjust infusion rate, formulation or volume based on feeding tolerance and clinical status (e.g., hemodynamic stability); minimize unnecessary interruptions.  Anticipate the need for a promotility agent if  reduced gastric emptying or delayed bowel motility is suspected.  Monitor nutrition delivery to ensure safe practices, such as confirmation of tube placement, tube patency, medication delivery, head of bed elevation and oral care.  Recent Flowsheet Documentation  Taken 1/5/2025 0800 by Leticia Vick RN  Nutrition Support Management: weight trending reviewed     Problem: Enteral Nutrition  Goal: Absence of Aspiration Signs and Symptoms  Outcome: Adequate for Care Transition  Intervention: Minimize Aspiration Risk  Description: Elevate head of bed to a semi-recumbent position.  Provide routine oral care with antimicrobial solution to reduce risk of infection.  Promote continuous gastric feedings; advocate for postpyloric feeding if higher aspiration risk.  Confirm placement of gastric or gastroenteric access device prior to initiation of feedings and with adjustments.  Fredis tube at exit site at time of initial x-ray; monitor exit site for tube migration.  Monitor for feeding intolerance (e.g., abdominal distension, gastric residual volume, nausea and vomiting).  Recent Flowsheet Documentation  Taken 1/5/2025 1200 by Leticia Vick RN  Oral Care: teeth brushed - suction toothbrush  Taken 1/5/2025 1100 by Leticia Vick RN  Head of Bed (HOB) Positioning: HOB at 30-45 degrees  Taken 1/5/2025 0900 by Leticia Vick RN  Head of Bed (HOB) Positioning: HOB at 30-45 degrees  Taken 1/5/2025 0800 by Leticia iVck RN  Oral Care: teeth brushed - suction toothbrush  Enteral Feeding Safety: placement checked  Taken 1/5/2025 0700 by Leticia Vick RN  Head of Bed (HOB) Positioning: HOB at 30-45 degrees  Goal: Safe, Effective Therapy Delivery  Outcome: Adequate for Care Transition  Intervention: Prevent Feeding-Related Adverse Events  Description: Utilize aseptic technique when initiating and handling enteral feeding system.  Avoid tubing misconnections by labelling and securing all tubing and  connections; trace all tubing back to origin.  Monitor and manage integrity of enteral access device (e.g., tension, pulling, connections, obstructions, leaking, cracks).  Stabilize and secure access device (e.g., abdominal binder, securement device).  Flush tube regularly (e.g., every 4 hours, before and after medication delivery, after intermittent feeding) to maintain tube patency; use purified water if immunocompromised or critically ill.  Review medications for drug-nutrient incompatibility and suitability of administration through enteral tube.  Manage tube occlusion (e.g., warm water flushes, enzymatic agent, mechanical dislodgement device).  Manage medication delivery (e.g., use clean enteral syringes, dilute powdered medication with water, use liquid dosage forms when available).  Recent Flowsheet Documentation  Taken 1/5/2025 0800 by Leticia Vick RN  Enteral Feeding Safety: placement checked  Goal: Feeding Tolerance  Outcome: Adequate for Care Transition  Intervention: Prevent and Manage Feeding Intolerance  Description: Initiate early enteral nutrition support to maintain gut integrity and function; monitor for refeeding syndrome.  Monitor abdominal girth and distension; assess need for promotility agent if suspect reduced gastric emptying or delayed bowel motility.  Monitor stool pattern and characteristics; implement bowel regimen to aid in correction of constipation or diarrhea; consider the impact of medications.  Advocate for and adjust infusion rate, formulation or volume based on feeding tolerance and clinical status.  Minimize unnecessary disruptions to feeding (e.g., prolonged NPO status for procedures, frequent gastric residual checks); use enteral nutrition protocol to optimize delivery.  Recent Flowsheet Documentation  Taken 1/5/2025 0800 by Leticia Vick RN  Nutrition Support Management: weight trending reviewed   Goal Outcome Evaluation:

## 2025-06-18 ENCOUNTER — APPOINTMENT (OUTPATIENT)
Dept: CT IMAGING | Age: 35
End: 2025-06-18
Attending: PHYSICIAN ASSISTANT

## 2025-06-18 ENCOUNTER — HOSPITAL ENCOUNTER (EMERGENCY)
Age: 35
Discharge: HOME | End: 2025-06-18
Attending: EMERGENCY MEDICINE

## 2025-06-18 ENCOUNTER — APPOINTMENT (OUTPATIENT)
Dept: CT IMAGING | Age: 35
End: 2025-06-18
Attending: EMERGENCY MEDICINE

## 2025-06-18 VITALS
OXYGEN SATURATION: 99 % | RESPIRATION RATE: 14 BRPM | WEIGHT: 224.65 LBS | HEART RATE: 59 BPM | SYSTOLIC BLOOD PRESSURE: 144 MMHG | HEIGHT: 76 IN | DIASTOLIC BLOOD PRESSURE: 92 MMHG | BODY MASS INDEX: 27.36 KG/M2 | TEMPERATURE: 98.1 F

## 2025-06-18 DIAGNOSIS — R51.9 ACUTE NONINTRACTABLE HEADACHE, UNSPECIFIED HEADACHE TYPE: Primary | ICD-10-CM

## 2025-06-18 LAB
ALBUMIN SERPL-MCNC: 4.1 G/DL (ref 3.4–5)
ALBUMIN/GLOB SERPL: 1.4 {RATIO} (ref 1–2.4)
ALP SERPL-CCNC: 100 UNITS/L (ref 45–117)
ALT SERPL-CCNC: 37 UNITS/L
ANION GAP SERPL CALC-SCNC: 11 MMOL/L (ref 7–19)
AST SERPL-CCNC: 38 UNITS/L
BASOPHILS # BLD: 0.1 K/MCL (ref 0–0.3)
BASOPHILS NFR BLD: 1 %
BILIRUB SERPL-MCNC: 0.6 MG/DL (ref 0.2–1)
BUN SERPL-MCNC: 16 MG/DL (ref 6–20)
BUN/CREAT SERPL: 15 (ref 7–25)
CALCIUM SERPL-MCNC: 9.3 MG/DL (ref 8.4–10.2)
CHLORIDE SERPL-SCNC: 103 MMOL/L (ref 97–110)
CO2 SERPL-SCNC: 28 MMOL/L (ref 21–32)
CREAT SERPL-MCNC: 1.07 MG/DL (ref 0.67–1.17)
DEPRECATED RDW RBC: 43 FL (ref 39–50)
EGFRCR SERPLBLD CKD-EPI 2021: >90 ML/MIN/{1.73_M2}
EOSINOPHIL # BLD: 0.2 K/MCL (ref 0–0.5)
EOSINOPHIL NFR BLD: 3 %
ERYTHROCYTE [DISTWIDTH] IN BLOOD: 12.4 % (ref 11–15)
FASTING DURATION TIME PATIENT: ABNORMAL H
GLOBULIN SER-MCNC: 2.9 G/DL (ref 2–4)
GLUCOSE SERPL-MCNC: 102 MG/DL (ref 70–99)
HCT VFR BLD CALC: 42.4 % (ref 39–51)
HGB BLD-MCNC: 14.1 G/DL (ref 13–17)
IMM GRANULOCYTES # BLD AUTO: 0 K/MCL (ref 0–0.2)
IMM GRANULOCYTES # BLD: 0 %
LYMPHOCYTES # BLD: 1.5 K/MCL (ref 1–4.8)
LYMPHOCYTES NFR BLD: 24 %
MCH RBC QN AUTO: 31.1 PG (ref 26–34)
MCHC RBC AUTO-ENTMCNC: 33.3 G/DL (ref 32–36.5)
MCV RBC AUTO: 93.4 FL (ref 78–100)
MONOCYTES # BLD: 0.5 K/MCL (ref 0.3–0.9)
MONOCYTES NFR BLD: 9 %
NEUTROPHILS # BLD: 3.8 K/MCL (ref 1.8–7.7)
NEUTROPHILS NFR BLD: 63 %
NRBC BLD MANUAL-RTO: 0 /100 WBC
PLATELET # BLD AUTO: 206 K/MCL (ref 140–450)
POTASSIUM SERPL-SCNC: 3.9 MMOL/L (ref 3.4–5.1)
PROT SERPL-MCNC: 7 G/DL (ref 6.4–8.2)
RBC # BLD: 4.54 MIL/MCL (ref 4.5–5.9)
SODIUM SERPL-SCNC: 138 MMOL/L (ref 135–145)
WBC # BLD: 6.1 K/MCL (ref 4.2–11)

## 2025-06-18 PROCEDURE — 10002807 HB RX 258: Performed by: EMERGENCY MEDICINE

## 2025-06-18 PROCEDURE — 99284 EMERGENCY DEPT VISIT MOD MDM: CPT | Performed by: EMERGENCY MEDICINE

## 2025-06-18 PROCEDURE — 96374 THER/PROPH/DIAG INJ IV PUSH: CPT

## 2025-06-18 PROCEDURE — 80053 COMPREHEN METABOLIC PANEL: CPT | Performed by: PHYSICIAN ASSISTANT

## 2025-06-18 PROCEDURE — 10002800 HB RX 250 W HCPCS: Performed by: EMERGENCY MEDICINE

## 2025-06-18 PROCEDURE — 10002805 HB CONTRAST AGENT: Performed by: EMERGENCY MEDICINE

## 2025-06-18 PROCEDURE — 96375 TX/PRO/DX INJ NEW DRUG ADDON: CPT

## 2025-06-18 PROCEDURE — 70496 CT ANGIOGRAPHY HEAD: CPT

## 2025-06-18 PROCEDURE — 85025 COMPLETE CBC W/AUTO DIFF WBC: CPT | Performed by: PHYSICIAN ASSISTANT

## 2025-06-18 RX ORDER — PROCHLORPERAZINE EDISYLATE 5 MG/ML
10 INJECTION INTRAMUSCULAR; INTRAVENOUS ONCE
Status: COMPLETED | OUTPATIENT
Start: 2025-06-18 | End: 2025-06-18

## 2025-06-18 RX ORDER — DIPHENHYDRAMINE HYDROCHLORIDE 50 MG/ML
50 INJECTION, SOLUTION INTRAMUSCULAR; INTRAVENOUS ONCE
Status: COMPLETED | OUTPATIENT
Start: 2025-06-18 | End: 2025-06-18

## 2025-06-18 RX ADMIN — DIPHENHYDRAMINE HYDROCHLORIDE 50 MG: 50 INJECTION, SOLUTION INTRAMUSCULAR; INTRAVENOUS at 10:58

## 2025-06-18 RX ADMIN — SODIUM CHLORIDE 1000 ML: 9 INJECTION, SOLUTION INTRAVENOUS at 11:04

## 2025-06-18 RX ADMIN — IOHEXOL 75 ML: 350 INJECTION, SOLUTION INTRAVENOUS at 10:33

## 2025-06-18 RX ADMIN — PROCHLORPERAZINE EDISYLATE 10 MG: 5 INJECTION INTRAMUSCULAR; INTRAVENOUS at 10:58

## 2025-06-18 ASSESSMENT — PAIN SCALES - GENERAL: PAINLEVEL_OUTOF10: 3

## 2025-06-18 ASSESSMENT — MOVEMENT AND STRENGTH ASSESSMENTS: HEAD_NECK: FULL RANGE OF MOTION

## (undated) DEVICE — 20 ML SYRINGE LUER-LOCK TIP: Brand: MONOJECT

## (undated) DEVICE — MEDI-VAC NON-CONDUCTIVE SUCTION TUBING: Brand: CARDINAL HEALTH

## (undated) DEVICE — PAD,ABDOMINAL,8"X7.5",STERILE,LF,1/PK: Brand: MEDLINE

## (undated) DEVICE — AMBIENT SUPER TURBOVAC 90 IFS: Brand: COBLATION

## (undated) DEVICE — APPLICATOR PREP 26ML CHG 2% ISO ALC 70%

## (undated) DEVICE — SPONGE GZ 4X4IN COT 12 PLY TYP VII WVN C

## (undated) DEVICE — 3M™ TEGADERM™ TRANSPARENT FILM DRESSING FRAME STYLE, 1626W, 4 IN X 4-3/4 IN (10 CM X 12 CM), 50/CT 4CT/CASE: Brand: 3M™ TEGADERM™

## (undated) DEVICE — TUBING PMP L16FT DISP

## (undated) DEVICE — SUT ETHLN 3-0 18IN PS-2 NABSRB BLK 19MM 3/8 C

## (undated) DEVICE — SUT ETHLN 3-0 30IN FS-1 NABSRB BLK 24MM 3/8 C

## (undated) DEVICE — GAMMEX® PI HYBRID SIZE 7, STERILE POWDER-FREE SURGICAL GLOVE, POLYISOPRENE AND NEOPRENE BLEND: Brand: GAMMEX

## (undated) DEVICE — SUT MCRYL 4-0 18IN PC-3 ABSRB UD L16MM 1/2 CI

## (undated) DEVICE — NEEDLE SPNL 18GA L3.5IN W/ QNCKE SHARPER BVL

## (undated) DEVICE — DISPOSABLE TOURNIQUET CUFF DUAL BLADDER, DUAL PORT AND QUICK CONNECT CONNECTOR: Brand: COLOR CUFF

## (undated) DEVICE — BLADE SHV L13CM DIA4MM BNE CUT AGG DEB

## (undated) DEVICE — INTENDED FOR TISSUE SEPARATION, AND OTHER PROCEDURES THAT REQUIRE A SHARP SURGICAL BLADE TO PUNCTURE OR CUT.: Brand: BARD-PARKER ® STAINLESS STEEL BLADES

## (undated) DEVICE — GAMMEX® PI HYBRID SIZE 9, STERILE POWDER-FREE SURGICAL GLOVE, POLYISOPRENE AND NEOPRENE BLEND: Brand: GAMMEX

## (undated) DEVICE — ZIMMER® STERILE DISPOSABLE TOURNIQUET CUFF WITH PLC, DUAL PORT, SINGLE BLADDER, 24 IN. (61 CM)

## (undated) DEVICE — DRAPE,U/ SHT,SPLIT,PLAS,STERIL: Brand: MEDLINE

## (undated) DEVICE — WEBRIL COTTON UNDERCAST PADDING: Brand: WEBRIL

## (undated) DEVICE — ENCORE® LATEX MICRO SIZE 7, STERILE LATEX POWDER-FREE SURGICAL GLOVE: Brand: ENCORE

## (undated) DEVICE — CANNULA ARTHSCP L7CM DIA5.75MM CRYS SMTH

## (undated) DEVICE — SUCTION CANISTER, 3000CC,SAFELINER: Brand: DEROYAL

## (undated) DEVICE — BNDG,ELSTC,MATRIX,STRL,4"X5YD,LF,HOOK&LP: Brand: MEDLINE

## (undated) DEVICE — 3M™ COBAN™ NL STERILE NON-LATEX SELF-ADHERENT WRAP, 2084S, 4 IN X 5 YD (10 CM X 4,5 M), 18 ROLLS/CASE: Brand: 3M™ COBAN™

## (undated) DEVICE — BLADE SHV L13CM DIA4MM EXCALIBUR AGG COOLCUT

## (undated) DEVICE — SOLUTION IRRIG 3000ML 0.9% NACL FLX CONT

## (undated) DEVICE — UPPER EXTREMITY: Brand: MEDLINE INDUSTRIES, INC.

## (undated) DEVICE — COTTON UNDERCAST PADDING,REGULAR FINISH: Brand: WEBRIL

## (undated) DEVICE — GAMMEX® NON-LATEX PI ORTHO SIZE 9, STERILE POLYISOPRENE POWDER-FREE SURGICAL GLOVE: Brand: GAMMEX

## (undated) DEVICE — 3M™ STERI-DRAPE™ U-DRAPE 1015: Brand: STERI-DRAPE™

## (undated) DEVICE — 3M™ STERI-STRIP™ REINFORCED ADHESIVE SKIN CLOSURES, R1547, 1/2 IN X 4 IN (12 MM X 100 MM), 6 STRIPS/ENVELOPE: Brand: 3M™ STERI-STRIP™